# Patient Record
Sex: MALE | Race: WHITE | URBAN - METROPOLITAN AREA
[De-identification: names, ages, dates, MRNs, and addresses within clinical notes are randomized per-mention and may not be internally consistent; named-entity substitution may affect disease eponyms.]

---

## 2017-11-10 ENCOUNTER — HISTORICAL (OUTPATIENT)
Dept: ADMINISTRATIVE | Facility: HOSPITAL | Age: 62
End: 2017-11-10

## 2017-11-10 LAB
ABS NEUT (OLG): 3.12 X10(3)/MCL (ref 2.1–9.2)
BASOPHILS # BLD AUTO: 0.1 X10(3)/MCL (ref 0–0.2)
BASOPHILS NFR BLD AUTO: 1 %
CRP SERPL HS-MCNC: 34 MG/L (ref 0–3)
EOSINOPHIL # BLD AUTO: 0.1 X10(3)/MCL (ref 0–0.9)
EOSINOPHIL NFR BLD AUTO: 3 %
ERYTHROCYTE [DISTWIDTH] IN BLOOD BY AUTOMATED COUNT: 14.2 % (ref 11.5–17)
ERYTHROCYTE [SEDIMENTATION RATE] IN BLOOD: 48 MM/HR (ref 0–15)
HCT VFR BLD AUTO: 39.2 % (ref 42–52)
HGB BLD-MCNC: 12.4 GM/DL (ref 14–18)
LYMPHOCYTES # BLD AUTO: 1.3 X10(3)/MCL (ref 0.6–4.6)
LYMPHOCYTES NFR BLD AUTO: 27 %
MCH RBC QN AUTO: 27.9 PG (ref 27–31)
MCHC RBC AUTO-ENTMCNC: 31.6 GM/DL (ref 33–36)
MCV RBC AUTO: 88.1 FL (ref 80–94)
MONOCYTES # BLD AUTO: 0.2 X10(3)/MCL (ref 0.1–1.3)
MONOCYTES NFR BLD AUTO: 4 %
NEUTROPHILS # BLD AUTO: 3.12 X10(3)/MCL (ref 2.1–9.2)
NEUTROPHILS NFR BLD AUTO: 64 %
PLATELET # BLD AUTO: 236 X10(3)/MCL (ref 130–400)
PMV BLD AUTO: 8.9 FL (ref 9.4–12.4)
RBC # BLD AUTO: 4.45 X10(6)/MCL (ref 4.7–6.1)
WBC # SPEC AUTO: 4.8 X10(3)/MCL (ref 4.5–11.5)

## 2017-11-15 ENCOUNTER — HISTORICAL (OUTPATIENT)
Dept: ADMINISTRATIVE | Facility: HOSPITAL | Age: 62
End: 2017-11-15

## 2018-12-14 LAB
ABS NEUT (OLG): 2.93 X10(3)/MCL (ref 2.1–9.2)
CRP SERPL HS-MCNC: 28.6 MG/L
ERYTHROCYTE [DISTWIDTH] IN BLOOD BY AUTOMATED COUNT: 14.2 % (ref 11.5–17)
ERYTHROCYTE [SEDIMENTATION RATE] IN BLOOD: 44 MM/HR (ref 0–20)
HCT VFR BLD AUTO: 36.8 % (ref 42–52)
HGB BLD-MCNC: 11.6 GM/DL (ref 14–18)
MCH RBC QN AUTO: 30.4 PG (ref 27–31)
MCHC RBC AUTO-ENTMCNC: 31.5 GM/DL (ref 33–36)
MCV RBC AUTO: 96.6 FL (ref 80–94)
PLATELET # BLD AUTO: 151 X10(3)/MCL (ref 130–400)
PMV BLD AUTO: 8.7 FL (ref 7.4–10.4)
RBC # BLD AUTO: 3.81 X10(6)/MCL (ref 4.7–6.1)
WBC # SPEC AUTO: 4.5 X10(3)/MCL (ref 4.5–11.5)

## 2019-01-09 ENCOUNTER — HISTORICAL (OUTPATIENT)
Dept: SURGERY | Facility: HOSPITAL | Age: 64
End: 2019-01-09

## 2022-04-10 ENCOUNTER — HISTORICAL (OUTPATIENT)
Dept: ADMINISTRATIVE | Facility: HOSPITAL | Age: 67
End: 2022-04-10

## 2022-04-26 VITALS
DIASTOLIC BLOOD PRESSURE: 72 MMHG | HEIGHT: 73 IN | BODY MASS INDEX: 33.02 KG/M2 | SYSTOLIC BLOOD PRESSURE: 140 MMHG | WEIGHT: 249.13 LBS

## 2022-04-30 NOTE — OP NOTE
* Final Report *  R hip injection     Patient:   Salazar Mondragon             MRN: [id number]            FIN: [id number]               Age:   63 years     Sex:  Male     :  1955   Associated Diagnoses:   Infection of prosthetic total hip joint   Author:   Andres Bennett MD      Operative Note   Operative Information   Date/ Time:  2019  14:14:00.     Procedures Performed: Right hip aspiration using fluoroscopy.     Preoperative Diagnosis: Infection of prosthetic total hip joint ([id number]-SN T84.59XA).     Postoperative Diagnosis: Infection of prosthetic total hip joint ([id number]-QC T84.59XA).     Surgeon: Andres Bennett MD.     Anesthesia: concious sedation.     Description of Procedure/Findings/    Complications: Patient was involved who complains of ongoing right hip pain with chronic drainage from his right hip.  We discussed all treatment options.  Patient has tried and failed all measures.  The risk, benefits, alternatives to aspiration of right hip under anesthesia were discussed in detail including but limited to infection, bleeding, scarring, need for revision surgery, and resolution of pain.  Despite these risks patient elected to proceed with surgical mention.    Patient seen in preoperative holding.  The risk benefits alternatives again discussed.  All questions answered no guarantees made.  Patient was marked and consented.  Was taken the operating room.  placed under conscious sedation.  Using fluoroscopic guidance, the insertion site was localized.  It was injected with 3-4 cc 1% lidocaine.  Afterwards an 18-gauge spinal needle was then inserted using fluoroscopic guidance to the superior anterior aspect of the right hip/acetabulum.  2-3 cc of Isovue contrast was then injected.  We had an excellent arthrogram.  No fluid able to be aspiration.  Upon further inspection, sinus arthrogram was noted.  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  Was transferred to recovery room in stable condition. postop he will be weightbearing as tolerated.  Patient will be discharged home.  .     Esimated blood loss.       Result type: Postoperative Note  Result date: January 09, 2019 14:29 CST  Result status: Auth (Verified)  Result title: R hip injection  Performed by: Andres Bennett MD on January 09, 2019 14:35 CST  Verified by: Andres Bennett MD on January 09, 2019 14:35 CST  Encounter info: [id number], [facility], Outpatient Bedded, 1/9/2019 - 1/9/2019    Moved to the correct document type by HIM